# Patient Record
Sex: FEMALE | Race: WHITE | Employment: FULL TIME
[De-identification: names, ages, dates, MRNs, and addresses within clinical notes are randomized per-mention and may not be internally consistent; named-entity substitution may affect disease eponyms.]

---

## 2024-09-30 ENCOUNTER — APPOINTMENT (OUTPATIENT)
Facility: HOSPITAL | Age: 69
End: 2024-09-30
Payer: COMMERCIAL

## 2024-09-30 ENCOUNTER — HOSPITAL ENCOUNTER (EMERGENCY)
Facility: HOSPITAL | Age: 69
Discharge: HOME | End: 2024-09-30
Attending: EMERGENCY MEDICINE
Payer: COMMERCIAL

## 2024-09-30 VITALS
HEART RATE: 60 BPM | DIASTOLIC BLOOD PRESSURE: 60 MMHG | SYSTOLIC BLOOD PRESSURE: 111 MMHG | TEMPERATURE: 97.9 F | OXYGEN SATURATION: 96 % | RESPIRATION RATE: 17 BRPM

## 2024-09-30 DIAGNOSIS — S52.501A CLOSED FRACTURE OF DISTAL ENDS OF RIGHT RADIUS AND ULNA, INITIAL ENCOUNTER: ICD-10-CM

## 2024-09-30 DIAGNOSIS — S52.501B TYPE I OR II OPEN FRACTURE OF DISTAL END OF RIGHT RADIUS WITH ULNA, INITIAL ENCOUNTER: Primary | ICD-10-CM

## 2024-09-30 DIAGNOSIS — S52.601A CLOSED FRACTURE OF DISTAL ENDS OF RIGHT RADIUS AND ULNA, INITIAL ENCOUNTER: ICD-10-CM

## 2024-09-30 DIAGNOSIS — S52.601B TYPE I OR II OPEN FRACTURE OF DISTAL END OF RIGHT RADIUS WITH ULNA, INITIAL ENCOUNTER: Primary | ICD-10-CM

## 2024-09-30 PROCEDURE — 6360000002 HC RX W HCPCS: Performed by: PHYSICIAN ASSISTANT

## 2024-09-30 PROCEDURE — 96374 THER/PROPH/DIAG INJ IV PUSH: CPT

## 2024-09-30 PROCEDURE — 99284 EMERGENCY DEPT VISIT MOD MDM: CPT

## 2024-09-30 PROCEDURE — 71101 X-RAY EXAM UNILAT RIBS/CHEST: CPT

## 2024-09-30 PROCEDURE — 73562 X-RAY EXAM OF KNEE 3: CPT

## 2024-09-30 PROCEDURE — 73080 X-RAY EXAM OF ELBOW: CPT

## 2024-09-30 PROCEDURE — 99284 EMERGENCY DEPT VISIT MOD MDM: CPT | Performed by: PHYSICIAN ASSISTANT

## 2024-09-30 PROCEDURE — 25605 CLTX DST RDL FX/EPHYS SEP W/: CPT

## 2024-09-30 PROCEDURE — 73110 X-RAY EXAM OF WRIST: CPT

## 2024-09-30 PROCEDURE — 6360000002 HC RX W HCPCS: Performed by: EMERGENCY MEDICINE

## 2024-09-30 PROCEDURE — 2580000003 HC RX 258: Performed by: PHYSICIAN ASSISTANT

## 2024-09-30 PROCEDURE — 25605 CLTX DST RDL FX/EPHYS SEP W/: CPT | Performed by: PHYSICIAN ASSISTANT

## 2024-09-30 PROCEDURE — 6370000000 HC RX 637 (ALT 250 FOR IP): Performed by: EMERGENCY MEDICINE

## 2024-09-30 RX ORDER — OXYCODONE AND ACETAMINOPHEN 5; 325 MG/1; MG/1
1 TABLET ORAL
Status: COMPLETED | OUTPATIENT
Start: 2024-09-30 | End: 2024-09-30

## 2024-09-30 RX ORDER — CEPHALEXIN 500 MG/1
500 CAPSULE ORAL 4 TIMES DAILY
Qty: 28 CAPSULE | Refills: 0 | Status: SHIPPED | OUTPATIENT
Start: 2024-09-30 | End: 2024-10-07

## 2024-09-30 RX ORDER — HYDROCODONE BITARTRATE AND ACETAMINOPHEN 5; 325 MG/1; MG/1
1 TABLET ORAL EVERY 6 HOURS PRN
Qty: 10 TABLET | Refills: 0 | Status: SHIPPED | OUTPATIENT
Start: 2024-09-30 | End: 2024-10-03

## 2024-09-30 RX ORDER — BUPIVACAINE HYDROCHLORIDE 5 MG/ML
20 INJECTION, SOLUTION EPIDURAL; INTRACAUDAL
Status: COMPLETED | OUTPATIENT
Start: 2024-09-30 | End: 2024-09-30

## 2024-09-30 RX ADMIN — BUPIVACAINE HYDROCHLORIDE 100 MG: 5 INJECTION, SOLUTION EPIDURAL; INTRACAUDAL; PERINEURAL at 15:45

## 2024-09-30 RX ADMIN — OXYCODONE HYDROCHLORIDE AND ACETAMINOPHEN 1 TABLET: 5; 325 TABLET ORAL at 14:09

## 2024-09-30 RX ADMIN — WATER 2000 MG: 1 INJECTION INTRAMUSCULAR; INTRAVENOUS; SUBCUTANEOUS at 16:57

## 2024-09-30 ASSESSMENT — PAIN - FUNCTIONAL ASSESSMENT
PAIN_FUNCTIONAL_ASSESSMENT: 0-10
PAIN_FUNCTIONAL_ASSESSMENT: INTOLERABLE, UNABLE TO DO ANY ACTIVE OR PASSIVE ACTIVITIES

## 2024-09-30 ASSESSMENT — PAIN SCALES - GENERAL: PAINLEVEL_OUTOF10: 9

## 2024-09-30 ASSESSMENT — PAIN DESCRIPTION - ONSET: ONSET: ON-GOING

## 2024-09-30 ASSESSMENT — PAIN DESCRIPTION - FREQUENCY: FREQUENCY: CONTINUOUS

## 2024-09-30 ASSESSMENT — PAIN DESCRIPTION - DESCRIPTORS: DESCRIPTORS: THROBBING

## 2024-09-30 ASSESSMENT — PAIN DESCRIPTION - LOCATION: LOCATION: ARM

## 2024-09-30 ASSESSMENT — PAIN DESCRIPTION - ORIENTATION: ORIENTATION: RIGHT

## 2024-09-30 ASSESSMENT — PAIN DESCRIPTION - PAIN TYPE: TYPE: ACUTE PAIN

## 2024-09-30 NOTE — ED TRIAGE NOTES
Pt arrives via EMS w/ c/o r. Arm injury. Pt tripped over rug, landing on r. Arm. +deformity, limited ROM. Denies hitting head or LOC. Denies taking OTC meds prior to arrival.

## 2024-09-30 NOTE — PROCEDURES
PROCEDURE NOTE  Date: 9/30/2024   Name: Angela Valdivia  YOB: 1955    Orthopedic Injury Treatment    Date/Time: 9/30/2024 4:31 PM    Performed by: Pavel Mike PA  Authorized by: Pavel Mike PA  Consent: Verbal consent obtained. Written consent obtained.  Risks and benefits: risks, benefits and alternatives were discussed  Consent given by: patient (and son at bedside)  Patient understanding: patient states understanding of the procedure being performed  Patient consent: the patient's understanding of the procedure matches consent given  Imaging studies: imaging studies available  Patient identity confirmed: arm band and verbally with patient  Injury location: wrist  Location details: right wrist  Injury type: fracture  Fracture type: distal radius and ulnar styloid  Pre-procedure neurovascular assessment: neurovascularly intact  Pre-procedure distal perfusion: normal  Pre-procedure neurological function: normal  Pre-procedure range of motion: reduced  Anesthesia: hematoma block    Anesthesia:  Local anesthesia used: yes  Local Anesthetic: bupivacaine 0.5% without epinephrine    Sedation:  Patient sedated: no    Manipulation performed: yes  Reduction successful: yes  X-ray confirmed reduction: yes  Immobilization: splint  Splint type: sugar tong  Supplies used: Ortho-Glass  Post-procedure neurovascular assessment: post-procedure neurovascularly intact  Post-procedure distal perfusion: normal  Post-procedure neurological function: normal  Patient tolerance: patient tolerated the procedure well with no immediate complications

## 2024-09-30 NOTE — CONSULTS
Dr. Hernández is aware and agrees with above plan.      CHAR Diaz  Orthopedic Trauma Service  [facility]

## 2024-09-30 NOTE — ED PROVIDER NOTES
patellofemoral and medial   compartment degenerative changes in the right and left knee.      Electronically signed by Gustavo Stern      XR KNEE LEFT (3 VIEWS)   Final Result   No acute abnormality in the right or left knee. Mild patellofemoral and medial   compartment degenerative changes in the right and left knee.      Electronically signed by Gustavo Stern      XR ELBOW LEFT (MIN 3 VIEWS)   Final Result   No acute abnormality in the right or left elbow.      Electronically signed by Gustavo Stern      XR RIBS RIGHT INCLUDE CHEST (MIN 3 VIEWS)   Final Result   No acute rib fracture or other acute abnormality in the chest.         Electronically signed by Gustavo Stern           LABS:  Labs Reviewed - No data to display    All other labs were within normal range or not returned as of this dictation.    EMERGENCY DEPARTMENT COURSE and DIFFERENTIAL DIAGNOSIS/MDM:   Vitals:    Vitals:    09/30/24 1207   BP: 111/60   Pulse: 60   Resp: 17   Temp: 97.9 °F (36.6 °C)   TempSrc: Oral   SpO2: 96%           Medical Decision Making  Amount and/or Complexity of Data Reviewed  Radiology: ordered.    Risk  Prescription drug management.            REASSESSMENT      4:53 PM  Patient reduced and splinted by orthopedics.  Due to abrasion overlying fracture receiving IV ancef in the ED and will discharge with prescription or keflex.  Patient to follow up with Dr. Hernández.      CRITICAL CARE TIME       CONSULTS:  IP CONSULT TO ORTHOPEDIC SURGERY    PROCEDURES:  Unless otherwise noted below, none     Procedures        FINAL IMPRESSION      1. Type I or II open fracture of distal end of right radius with ulna, initial encounter    2. Closed fracture of distal ends of right radius and ulna, initial encounter          DISPOSITION/PLAN   DISPOSITION        PATIENT REFERRED TO:  Clinton Hernández MD  [address] [address] [phone]    Schedule an appointment as soon as possible for a visit on

## 2024-10-03 RX ORDER — ENALAPRIL MALEATE 10 MG/1
1 TABLET ORAL DAILY
COMMUNITY

## 2024-10-03 RX ORDER — LEVOTHYROXINE SODIUM 100 UG/1
100 TABLET ORAL DAILY
COMMUNITY

## 2024-10-03 RX ORDER — ROSUVASTATIN CALCIUM 40 MG/1
1 TABLET, COATED ORAL EVERY EVENING
COMMUNITY

## 2024-10-03 RX ORDER — DAPAGLIFLOZIN 10 MG/1
10 TABLET, FILM COATED ORAL EVERY MORNING
COMMUNITY
Start: 2024-09-28

## 2024-10-03 RX ORDER — OMEPRAZOLE 40 MG/1
40 CAPSULE, DELAYED RELEASE ORAL DAILY
COMMUNITY

## 2024-10-03 NOTE — PERIOP NOTE
[address] [address]   MAIN OR                                  [phone]    MAIN PRE OP             [phone]                                                                                AMBULATORY PRE OP          [phone]  PRE-ADMISSION TESTING    [phone]     Surgery Date:  10/07/2024       Is surgery arrival time given by surgeon?  YES  NO    If “NO”, [facility] staff will call you between 4 and 7pm the day before your surgery with your arrival time. (If your surgery is on a Monday, we will call you the Friday before.)    Call [phone] after 7pm Monday-Friday if you did not receive this call.    INSTRUCTIONS BEFORE YOUR SURGERY   When You  Arrive Arrive at [facility] on 1st floor the day of your surgery.    Have your insurance card, photo ID, living will/advanced directive/POA (if applicable),  and any copayment (if needed)     Food   and   Drink NO food or drink after midnight the night before surgery    This means NO water, gum, mints, coffee, juice, etc.    No alcohol (beer, wine, liquor) or marijuana (smoking) 24 hours prior to surgery, or Marijuana edibles for 3 days prior to surgery.  Stop smoking cigarettes 14 days before surgery (helps w/healing and breathing).     Medications to   TAKE   Morning of Surgery MEDICATIONS TO TAKE THE MORNING OF SURGERY WITH A SIP OF WATER: KEFLEX, LEVOTHYROXINE, OMEPRAZOLE     You may take these medications, IF NEEDED, the morning of surgery: TYLENOL-LAST DOSE IS TO BE 4 HOURS PRIOR TO ARRIVAL    Ask your surgeon/prescribing doctor for instructions on taking or stopping these medications prior to surgery: NONE       Medications to STOP  before surgery Non-Steroidal anti-inflammatory Drugs (NSAID's): for example, Diclofenac (Voltaren), Ibuprofen (Advil, Motrin), Naproxen (Aleve) 3 days STOP ON 10/04/2024    STOP all herbal supplements and vitamins(unless prescribed by

## 2024-10-03 NOTE — PERIOP NOTE
INSTRUCTIONS REVIEWED WITH PT, PT GIVEN TIME TO ASK QUESTIONS, INSTRUCTIONS SENT TO PT VIA MY CHART

## 2024-10-04 ENCOUNTER — ANESTHESIA EVENT (OUTPATIENT)
Facility: HOSPITAL | Age: 69
End: 2024-10-04
Payer: COMMERCIAL

## 2024-10-07 ENCOUNTER — ANESTHESIA (OUTPATIENT)
Facility: HOSPITAL | Age: 69
End: 2024-10-07
Payer: COMMERCIAL

## 2024-10-07 ENCOUNTER — HOSPITAL ENCOUNTER (OUTPATIENT)
Facility: HOSPITAL | Age: 69
Setting detail: OUTPATIENT SURGERY
Discharge: HOME | End: 2024-10-07
Attending: ORTHOPAEDIC SURGERY | Admitting: ORTHOPAEDIC SURGERY
Payer: COMMERCIAL

## 2024-10-07 VITALS
OXYGEN SATURATION: 95 % | WEIGHT: 215 LBS | RESPIRATION RATE: 15 BRPM | DIASTOLIC BLOOD PRESSURE: 64 MMHG | BODY MASS INDEX: 31.84 KG/M2 | HEIGHT: 69 IN | TEMPERATURE: 98 F | HEART RATE: 59 BPM | SYSTOLIC BLOOD PRESSURE: 128 MMHG

## 2024-10-07 DIAGNOSIS — S52.571A OTHER CLOSED INTRA-ARTICULAR FRACTURE OF DISTAL END OF RIGHT RADIUS, INITIAL ENCOUNTER: Primary | ICD-10-CM

## 2024-10-07 LAB
GLUCOSE BLD STRIP.AUTO-MCNC: 103 MG/DL (ref 65–117)
GLUCOSE BLD STRIP.AUTO-MCNC: 117 MG/DL (ref 65–117)
SERVICE CMNT-IMP: NORMAL
SERVICE CMNT-IMP: NORMAL

## 2024-10-07 PROCEDURE — 6360000002 HC RX W HCPCS: Performed by: ANESTHESIOLOGY

## 2024-10-07 PROCEDURE — 2709999900 HC NON-CHARGEABLE SUPPLY: Performed by: ORTHOPAEDIC SURGERY

## 2024-10-07 PROCEDURE — 2500000003 HC RX 250 WO HCPCS: Performed by: ANESTHESIOLOGY

## 2024-10-07 PROCEDURE — 3700000001 HC ADD 15 MINUTES (ANESTHESIA): Performed by: ORTHOPAEDIC SURGERY

## 2024-10-07 PROCEDURE — 6360000002 HC RX W HCPCS

## 2024-10-07 PROCEDURE — 3600000014 HC SURGERY LEVEL 4 ADDTL 15MIN: Performed by: ORTHOPAEDIC SURGERY

## 2024-10-07 PROCEDURE — 6370000000 HC RX 637 (ALT 250 FOR IP): Performed by: ANESTHESIOLOGY

## 2024-10-07 PROCEDURE — 2720000010 HC SURG SUPPLY STERILE: Performed by: ORTHOPAEDIC SURGERY

## 2024-10-07 PROCEDURE — 82962 GLUCOSE BLOOD TEST: CPT

## 2024-10-07 PROCEDURE — 7100000010 HC PHASE II RECOVERY - FIRST 15 MIN: Performed by: ORTHOPAEDIC SURGERY

## 2024-10-07 PROCEDURE — 7100000000 HC PACU RECOVERY - FIRST 15 MIN: Performed by: ORTHOPAEDIC SURGERY

## 2024-10-07 PROCEDURE — 3600000004 HC SURGERY LEVEL 4 BASE: Performed by: ORTHOPAEDIC SURGERY

## 2024-10-07 PROCEDURE — 2580000003 HC RX 258: Performed by: ANESTHESIOLOGY

## 2024-10-07 PROCEDURE — 7100000001 HC PACU RECOVERY - ADDTL 15 MIN: Performed by: ORTHOPAEDIC SURGERY

## 2024-10-07 PROCEDURE — 2580000003 HC RX 258

## 2024-10-07 PROCEDURE — C1713 ANCHOR/SCREW BN/BN,TIS/BN: HCPCS | Performed by: ORTHOPAEDIC SURGERY

## 2024-10-07 PROCEDURE — 3700000000 HC ANESTHESIA ATTENDED CARE: Performed by: ORTHOPAEDIC SURGERY

## 2024-10-07 DEVICE — SCREW BNE L14MM DIA2.7MM DST VOLAR RAD LOK FULL THRD SQ DRV: Type: IMPLANTABLE DEVICE | Site: ARM | Status: FUNCTIONAL

## 2024-10-07 DEVICE — SCREW BNE L14MM DIA2.7MM DST VOLAR RAD NONLOCKING FULL THRD: Type: IMPLANTABLE DEVICE | Site: ARM | Status: FUNCTIONAL

## 2024-10-07 DEVICE — PEG BONE FXTN L20MM D2.2MM DST VOLAR RDL SMOOTH LOK CRSSLCK: Type: IMPLANTABLE DEVICE | Site: ARM | Status: FUNCTIONAL

## 2024-10-07 DEVICE — PEG BONE L14MM D2.2MM DST VOLAR RDS BLUE STNLSS STEEL SMOOTH: Type: IMPLANTABLE DEVICE | Site: ARM | Status: FUNCTIONAL

## 2024-10-07 DEVICE — PEG BNE FIX L16MM DIA2.2MM DST VOLAR RAD SMOOTH LOK FOR DVR: Type: IMPLANTABLE DEVICE | Site: ARM | Status: FUNCTIONAL

## 2024-10-07 DEVICE — SCREW BNE FIX L18MM DIA2.2MM DST VOLAR RAD SMOOTH LOK PEG: Type: IMPLANTABLE DEVICE | Site: ARM | Status: FUNCTIONAL

## 2024-10-07 DEVICE — K WIRE FIX DIA1.6MM S STL FOR DST VOLAR PLATING SYS: Type: IMPLANTABLE DEVICE | Site: ARM | Status: FUNCTIONAL

## 2024-10-07 DEVICE — SCREW BNE L16MM DIA2.7MM DST VOLAR RAD NONLOCKING FULL THRD: Type: IMPLANTABLE DEVICE | Site: ARM | Status: FUNCTIONAL

## 2024-10-07 DEVICE — PLATE BNE W24XL43MM 12 H R DST RAD TI LOK COMPR LO PROF RIG: Type: IMPLANTABLE DEVICE | Site: ARM | Status: FUNCTIONAL

## 2024-10-07 RX ORDER — SODIUM CHLORIDE 0.9 % (FLUSH) 0.9 %
5-40 SYRINGE (ML) INJECTION EVERY 12 HOURS SCHEDULED
Status: DISCONTINUED | OUTPATIENT
Start: 2024-10-07 | End: 2024-10-07 | Stop reason: HOSPADM

## 2024-10-07 RX ORDER — ROPIVACAINE HYDROCHLORIDE 5 MG/ML
30 INJECTION, SOLUTION EPIDURAL; INFILTRATION; PERINEURAL ONCE
Status: DISCONTINUED | OUTPATIENT
Start: 2024-10-07 | End: 2024-10-07 | Stop reason: HOSPADM

## 2024-10-07 RX ORDER — OXYCODONE AND ACETAMINOPHEN 5; 325 MG/1; MG/1
1 TABLET ORAL EVERY 4 HOURS PRN
Qty: 20 TABLET | Refills: 0 | Status: SHIPPED | OUTPATIENT
Start: 2024-10-07 | End: 2024-10-12

## 2024-10-07 RX ORDER — SODIUM CHLORIDE 0.9 % (FLUSH) 0.9 %
5-40 SYRINGE (ML) INJECTION PRN
Status: DISCONTINUED | OUTPATIENT
Start: 2024-10-07 | End: 2024-10-07 | Stop reason: HOSPADM

## 2024-10-07 RX ORDER — FENTANYL CITRATE 50 UG/ML
25 INJECTION, SOLUTION INTRAMUSCULAR; INTRAVENOUS EVERY 5 MIN PRN
Status: DISCONTINUED | OUTPATIENT
Start: 2024-10-07 | End: 2024-10-07 | Stop reason: HOSPADM

## 2024-10-07 RX ORDER — SODIUM CHLORIDE 9 MG/ML
INJECTION, SOLUTION INTRAVENOUS PRN
Status: DISCONTINUED | OUTPATIENT
Start: 2024-10-07 | End: 2024-10-07 | Stop reason: HOSPADM

## 2024-10-07 RX ORDER — ONDANSETRON 2 MG/ML
INJECTION INTRAMUSCULAR; INTRAVENOUS
Status: DISCONTINUED | OUTPATIENT
Start: 2024-10-07 | End: 2024-10-07 | Stop reason: SDUPTHER

## 2024-10-07 RX ORDER — LABETALOL HYDROCHLORIDE 5 MG/ML
10 INJECTION, SOLUTION INTRAVENOUS
Status: DISCONTINUED | OUTPATIENT
Start: 2024-10-07 | End: 2024-10-07 | Stop reason: HOSPADM

## 2024-10-07 RX ORDER — MIDAZOLAM HYDROCHLORIDE 2 MG/2ML
2 INJECTION, SOLUTION INTRAMUSCULAR; INTRAVENOUS PRN
Status: DISCONTINUED | OUTPATIENT
Start: 2024-10-07 | End: 2024-10-07 | Stop reason: HOSPADM

## 2024-10-07 RX ORDER — ROPIVACAINE HYDROCHLORIDE 5 MG/ML
INJECTION, SOLUTION EPIDURAL; INFILTRATION; PERINEURAL
Status: COMPLETED | OUTPATIENT
Start: 2024-10-07 | End: 2024-10-07

## 2024-10-07 RX ORDER — PROCHLORPERAZINE EDISYLATE 5 MG/ML
5 INJECTION INTRAMUSCULAR; INTRAVENOUS
Status: DISCONTINUED | OUTPATIENT
Start: 2024-10-07 | End: 2024-10-07 | Stop reason: HOSPADM

## 2024-10-07 RX ORDER — ACETAMINOPHEN 325 MG/1
650 TABLET ORAL ONCE
Status: COMPLETED | OUTPATIENT
Start: 2024-10-07 | End: 2024-10-07

## 2024-10-07 RX ORDER — DEXMEDETOMIDINE HYDROCHLORIDE 100 UG/ML
INJECTION, SOLUTION INTRAVENOUS
Status: DISCONTINUED | OUTPATIENT
Start: 2024-10-07 | End: 2024-10-07 | Stop reason: SDUPTHER

## 2024-10-07 RX ORDER — NALOXONE HYDROCHLORIDE 0.4 MG/ML
INJECTION, SOLUTION INTRAMUSCULAR; INTRAVENOUS; SUBCUTANEOUS PRN
Status: DISCONTINUED | OUTPATIENT
Start: 2024-10-07 | End: 2024-10-07 | Stop reason: HOSPADM

## 2024-10-07 RX ORDER — LIDOCAINE HYDROCHLORIDE 10 MG/ML
1 INJECTION, SOLUTION EPIDURAL; INFILTRATION; INTRACAUDAL; PERINEURAL
Status: DISCONTINUED | OUTPATIENT
Start: 2024-10-07 | End: 2024-10-07 | Stop reason: HOSPADM

## 2024-10-07 RX ORDER — OXYCODONE HYDROCHLORIDE 5 MG/1
5 TABLET ORAL
Status: DISCONTINUED | OUTPATIENT
Start: 2024-10-07 | End: 2024-10-07 | Stop reason: HOSPADM

## 2024-10-07 RX ORDER — OXYCODONE AND ACETAMINOPHEN 5; 325 MG/1; MG/1
1 TABLET ORAL EVERY 4 HOURS PRN
Status: ON HOLD | COMMUNITY
End: 2024-10-07 | Stop reason: HOSPADM

## 2024-10-07 RX ORDER — FENTANYL CITRATE 50 UG/ML
50 INJECTION, SOLUTION INTRAMUSCULAR; INTRAVENOUS PRN
Status: DISCONTINUED | OUTPATIENT
Start: 2024-10-07 | End: 2024-10-07 | Stop reason: HOSPADM

## 2024-10-07 RX ORDER — SODIUM CHLORIDE, SODIUM LACTATE, POTASSIUM CHLORIDE, CALCIUM CHLORIDE 600; 310; 30; 20 MG/100ML; MG/100ML; MG/100ML; MG/100ML
INJECTION, SOLUTION INTRAVENOUS CONTINUOUS
Status: DISCONTINUED | OUTPATIENT
Start: 2024-10-07 | End: 2024-10-07 | Stop reason: HOSPADM

## 2024-10-07 RX ORDER — LIDOCAINE HYDROCHLORIDE 20 MG/ML
INJECTION, SOLUTION EPIDURAL; INFILTRATION; INTRACAUDAL; PERINEURAL
Status: DISCONTINUED | OUTPATIENT
Start: 2024-10-07 | End: 2024-10-07 | Stop reason: SDUPTHER

## 2024-10-07 RX ORDER — FENTANYL CITRATE 50 UG/ML
25 INJECTION, SOLUTION INTRAMUSCULAR; INTRAVENOUS PRN
Status: DISCONTINUED | OUTPATIENT
Start: 2024-10-07 | End: 2024-10-07 | Stop reason: HOSPADM

## 2024-10-07 RX ORDER — ONDANSETRON 2 MG/ML
4 INJECTION INTRAMUSCULAR; INTRAVENOUS
Status: DISCONTINUED | OUTPATIENT
Start: 2024-10-07 | End: 2024-10-07 | Stop reason: HOSPADM

## 2024-10-07 RX ORDER — SODIUM CHLORIDE 9 MG/ML
INJECTION, SOLUTION INTRAVENOUS CONTINUOUS
Status: DISCONTINUED | OUTPATIENT
Start: 2024-10-07 | End: 2024-10-07 | Stop reason: HOSPADM

## 2024-10-07 RX ORDER — MEPERIDINE HYDROCHLORIDE 25 MG/ML
12.5 INJECTION INTRAMUSCULAR; INTRAVENOUS; SUBCUTANEOUS EVERY 5 MIN PRN
Status: DISCONTINUED | OUTPATIENT
Start: 2024-10-07 | End: 2024-10-07 | Stop reason: HOSPADM

## 2024-10-07 RX ORDER — MIDAZOLAM HYDROCHLORIDE 2 MG/2ML
2 INJECTION, SOLUTION INTRAMUSCULAR; INTRAVENOUS
Status: DISCONTINUED | OUTPATIENT
Start: 2024-10-07 | End: 2024-10-07 | Stop reason: HOSPADM

## 2024-10-07 RX ORDER — DIPHENHYDRAMINE HYDROCHLORIDE 50 MG/ML
12.5 INJECTION INTRAMUSCULAR; INTRAVENOUS
Status: DISCONTINUED | OUTPATIENT
Start: 2024-10-07 | End: 2024-10-07 | Stop reason: HOSPADM

## 2024-10-07 RX ADMIN — PROPOFOL 50 MCG/KG/MIN: 10 INJECTION, EMULSION INTRAVENOUS at 11:36

## 2024-10-07 RX ADMIN — ONDANSETRON 4 MG: 2 INJECTION INTRAMUSCULAR; INTRAVENOUS at 11:58

## 2024-10-07 RX ADMIN — LIDOCAINE HYDROCHLORIDE 20 MG: 20 INJECTION, SOLUTION EPIDURAL; INFILTRATION; INTRACAUDAL; PERINEURAL at 11:36

## 2024-10-07 RX ADMIN — SODIUM CHLORIDE, POTASSIUM CHLORIDE, SODIUM LACTATE AND CALCIUM CHLORIDE: 600; 310; 30; 20 INJECTION, SOLUTION INTRAVENOUS at 11:08

## 2024-10-07 RX ADMIN — ROPIVACAINE HYDROCHLORIDE 30 ML: 5 INJECTION, SOLUTION EPIDURAL; INFILTRATION; PERINEURAL at 11:19

## 2024-10-07 RX ADMIN — MIDAZOLAM 2 MG: 1 INJECTION INTRAMUSCULAR; INTRAVENOUS at 11:11

## 2024-10-07 RX ADMIN — DEXMEDETOMIDINE 10 MCG: 100 INJECTION, SOLUTION, CONCENTRATE INTRAVENOUS at 11:36

## 2024-10-07 RX ADMIN — DEXMEDETOMIDINE 10 MCG: 100 INJECTION, SOLUTION, CONCENTRATE INTRAVENOUS at 11:40

## 2024-10-07 RX ADMIN — WATER 2000 MG: 1 INJECTION INTRAMUSCULAR; INTRAVENOUS; SUBCUTANEOUS at 11:45

## 2024-10-07 RX ADMIN — FENTANYL CITRATE 50 MCG: 50 INJECTION INTRAMUSCULAR; INTRAVENOUS at 11:11

## 2024-10-07 RX ADMIN — ACETAMINOPHEN 650 MG: 325 TABLET ORAL at 10:48

## 2024-10-07 ASSESSMENT — PAIN - FUNCTIONAL ASSESSMENT: PAIN_FUNCTIONAL_ASSESSMENT: 0-10

## 2024-10-07 ASSESSMENT — PAIN DESCRIPTION - DESCRIPTORS: DESCRIPTORS: SHARP;DULL;ACHING

## 2024-10-07 NOTE — ANESTHESIA PRE PROCEDURE
opening: > = 3 FB   Dental: normal exam         Pulmonary:Negative Pulmonary ROS and normal exam                               Cardiovascular:  Exercise tolerance: good (>4 METS)  (+) hypertension:        Rhythm: regular  Rate: normal                    Neuro/Psych:   Negative Neuro/Psych ROS              GI/Hepatic/Renal:   (+) GERD:          Endo/Other:    (+) Diabetes.                 Abdominal:             Vascular:          Other Findings:       Anesthesia Plan      regional     ASA 2             Anesthetic plan and risks discussed with patient.      Plan discussed with CRNA.                Reagan Reynolds MD   10/7/2024

## 2024-10-07 NOTE — FLOWSHEET NOTE
10/07/24 1155   Family Communication    Relationship to Patient Spouse    Phone Number GABBIE SOLORIO/[phone]   Family/Significant Other Update Called   Delivery Origin Nurse   Message Disposition Family present - message delivered   Update Given Yes   Family Communication   Family Update Message Procedure started

## 2024-10-07 NOTE — OP NOTE
easily exposed. The   fracture was then reduced and held in place with provisional K-wire   fixation. The volar plate was then applied and held in place   provisionally with multiple K-wires. Fluoroscopy was brought in and   confirmed good reduction of the fracture and good alignment of the   hardware. Multiple locking pegs were placed distally.  Multiple   locking and nonlocking screws were placed proximally. K-wires were   removed. The wrist and forearm were taken through a full range of   motion without crepitus.      The wound was thoroughly irrigated. The pronator was closed with a 3-  0 Vicryl and the skin was closed with 3-0 Vicryl and 4-0 nylon. Sterile   dressings were applied. A splint was applied. The tourniquet was let   down and brisk capillary refill returned to the digits. The patient was awakened   from light sedation and transferred to the PACU in stable condition   without complication.      CHAR Ortega assisted in this surgery by retracting soft tissues during the salient portions of the surgery,aiding in reduction of the fracture, placement of hardware, by finishing the closure of the wound, and placement of post-op dressings/splint.

## 2024-10-07 NOTE — DISCHARGE INSTRUCTIONS
business decisions  Do not drink alcoholic beverages    Report the following to your surgeon:  Excessive pain, swelling, redness or odor of or around the surgical area  Temperature over 100.5 degrees  Nausea and vomiting lasting longer than 4 hours or if unable to take medication  Any signs of decreased circulation or nerve impairment to extremity:  Change in color, persistent numbness, tingling, coldness or increased pain.  Any questions    You took Tylenol 1000 mg at 10:48 am , no Tylenol before 4:48 pm today

## 2024-10-07 NOTE — INTERVAL H&P NOTE
Update History & Physical    The patient's History and Physical of October 7, 2024 was reviewed with the patient and I examined the patient. There was no change. The surgical site was confirmed by the patient and me.     Plan: The risks, benefits, expected outcome, and alternative to the recommended procedure have been discussed with the patient. Patient understands and wants to proceed with the procedure.     Electronically signed by CHAR Mendoza on 10/7/2024 at 11:15 AM

## 2024-10-07 NOTE — H&P
ASSESSMENT:  1. Other closed intra-articular fracture of distal end of right radius, initial encounter              Patient Active Problem List   Diagnosis    Calculus of kidney    Hematuria    Hydronephrosis    Proteinuria    Bilateral kidney stones    Hypothyroidism    Arthritis    Diabetes    Dyslipidemia    GERD (gastroesophageal reflux disease)    Hypertension         PLAN:  Treatment Plan: I recommend surgical management.  Open reduction internal fixation under regional anesthesia.  We discussed reasonable expectations.  We discussed usual postoperative course.  She has given informed consent to proceed         Orders Placed This Encounter    Cast application    Surgical Posting Sheet         Follow-up: Return for first postoperative visit.      HISTORY OF PRESENT ILLNESS:  Chief Complaint: Pain of the Right Forearm, Arm Injury, and Arm Pain   Age: 69 y.o.  Sex: female   History of present illness: Angela Valdivia  is a pleasant 69 y.o. female who presents today for evaluation of the right arm.  She had a fall several days ago.  Resulting pain and deformity of the extremity.  Seen in the ER.  Diagnosed with a fracture.  Placed into a splint.  She was noted to have a small laceration.  She has been on oral antibiotics.  Pain is reasonably well-controlled     Past medical history, past surgical history, medications, allergies, social history, and review of systems have been reviewed by me.    No current facility-administered medications on file prior to encounter.     Current Outpatient Medications on File Prior to Encounter   Medication Sig Dispense Refill    dapagliflozin (FARXIGA) 10 MG tablet Take 1 tablet by mouth every morning      vitamin D 25 MCG (1000 UT) CAPS Take 1 capsule by mouth daily      Multiple Vitamin (MULTIVITAMIN PO) Take 1 tablet by mouth daily      enalapril (VASOTEC) 10 MG tablet Take 1 tablet by mouth daily      omeprazole (PRILOSEC) 40 MG delayed release capsule Take 1 capsule by mouth

## 2024-10-07 NOTE — ANESTHESIA POSTPROCEDURE EVALUATION
Department of Anesthesiology  Postprocedure Note    Patient: Angela Valdivia  MRN: [id number]  YOB: 1955  Date of evaluation: 10/7/2024    Procedure Summary       Date: 10/07/24 Room / Location: [facility] MAIN OR [address] [place] [address] MAIN OR    Anesthesia Start: 1130 Anesthesia Stop: 1222    Procedure: OPEN REDUCTION INTERNAL FIXATION RIGHT DISTAL RADIUS FRACTURE (Right: Arm Lower) Diagnosis:       Radius fracture die punch, closed, right, initial encounter      (Radius fracture die punch, closed, right, initial encounter [S52.571A])    Providers: Clinton Hernández MD Responsible Provider: Reagan Reynolds MD    Anesthesia Type: MAC, Regional ASA Status: 2            Anesthesia Type: MAC, Regional    Kimi Phase I: Kimi Score: 10    Kimi Phase II: Kimi Score: 10    Anesthesia Post Evaluation    Patient location during evaluation: PACU  Patient participation: complete - patient participated  Level of consciousness: awake and alert  Pain score: 0  Airway patency: patent  Nausea & Vomiting: no nausea and no vomiting  Cardiovascular status: hemodynamically stable  Respiratory status: acceptable  Hydration status: euvolemic  Multimodal analgesia pain management approach  Pain management: adequate    No notable events documented.

## 2024-10-07 NOTE — ANESTHESIA PROCEDURE NOTES
Peripheral Block    Patient location during procedure: pre-op  Reason for block: primary anesthetic  Start time: 10/7/2024 11:15 AM  End time: 10/7/2024 11:19 AM  Staffing  Performed: anesthesiologist   Performed by: Reagan Reynolds MD  Authorized by: Reagan Reynolds MD    Preanesthetic Checklist  Completed: patient identified, IV checked, site marked, risks and benefits discussed, surgical/procedural consents, equipment checked, pre-op evaluation, timeout performed, anesthesia consent given, oxygen available, monitors applied/VS acknowledged, fire risk safety assessment completed and verbalized and blood product R/B/A discussed and consented  Peripheral Block   Patient position: supine  Prep: ChloraPrep  Provider prep: sterile gloves and mask  Patient monitoring: cardiac monitor, continuous pulse ox, frequent blood pressure checks, IV access, oxygen and responsive to questions  Block type: Brachial plexus  Supraclavicular  Laterality: right  Injection technique: single-shot  Guidance: ultrasound guided    Needle   Needle type: insulated echogenic nerve stimulator needle   Needle gauge: 22 G  Needle localization: ultrasound guidance  Needle length: 5 cm  Assessment   Injection assessment: negative aspiration for heme, no paresthesia on injection, local visualized surrounding nerve on ultrasound and no intravascular symptoms  Paresthesia pain: none  Slow fractionated injection: yes  Hemodynamics: stable  Outcomes: uncomplicated    Medications Administered  ropivacaine (NAROPIN) injection 0.5% - Perineural   30 mL - 10/7/2024 11:19:00 AM

## 2024-10-07 NOTE — PROGRESS NOTES
Discharge instructions reviewed with patient and family using teachback. All questions have been answered. Prescriptions sent to [facility] pharmacy. Vital signs stable, pain appropriately managed. Patient wheeled off the unit with PACU staff.

## (undated) DEVICE — DISPOSABLE TOURNIQUET CUFF SINGLE BLADDER, DUAL PORT AND QUICK CONNECT CONNECTOR: Brand: COLOR CUFF

## (undated) DEVICE — BANDAGE,ELASTIC,ESMARK,STERILE,4"X9',LF: Brand: MEDLINE

## (undated) DEVICE — GOWN,SIRUS,NONRNF,SETINSLV,XL,20/CS: Brand: MEDLINE

## (undated) DEVICE — CORD ES L12FT BPLR FRCP

## (undated) DEVICE — ASTOUND STANDARD SURGICAL GOWN, XXL: Brand: CONVERTORS

## (undated) DEVICE — APPLICATOR MEDICATED 26 CC SOLUTION HI LT ORNG CHLORAPREP

## (undated) DEVICE — GLOVE SURG SZ 6 L12IN FNGR THK79MIL GRN LTX FREE

## (undated) DEVICE — DRESSING,GAUZE,XEROFORM,CURAD,5"X9",ST: Brand: CURAD

## (undated) DEVICE — SOLUTION IRRIG 1000ML 0.9% SOD CHL USP POUR PLAS BTL

## (undated) DEVICE — X-RAY DETECTABLE SPONGES,16 PLY: Brand: VISTEC

## (undated) DEVICE — DRAPE SET MINI CARM FTSWCH AND PLATE

## (undated) DEVICE — MINOR BASIN -SMH: Brand: MEDLINE INDUSTRIES, INC.

## (undated) DEVICE — DRAPE,HAND,STERILE: Brand: MEDLINE

## (undated) DEVICE — SYRINGE,EAR/ULCER, 2 OZ, STERILE: Brand: MEDLINE

## (undated) DEVICE — GLOVE SURG SZ 75 L12IN FNGR THK79MIL GRN LTX FREE

## (undated) DEVICE — PADDING CAST 4 INX5 YD STRL

## (undated) DEVICE — DRAPE,REIN 53X77,STERILE: Brand: MEDLINE

## (undated) DEVICE — LIGHT HANDLE COVER ,2 PER PACK: Brand: DEROYAL

## (undated) DEVICE — SPONGE GZ W4XL4IN COT 12 PLY TYP VII WVN C FLD DSGN STERILE

## (undated) DEVICE — SUTURE VICRYL + SZ 3-0 L27IN ABSRB UD L26MM SH 1/2 CIR VCP416H

## (undated) DEVICE — DRAPE C ARM W41XL59IN MINI ELAS OPN

## (undated) DEVICE — BIT DRL DIA2.2MM CROSSLOCK MOD TY DVR ANAT VOLAR PLATING